# Patient Record
Sex: FEMALE | Race: WHITE | NOT HISPANIC OR LATINO | URBAN - METROPOLITAN AREA
[De-identification: names, ages, dates, MRNs, and addresses within clinical notes are randomized per-mention and may not be internally consistent; named-entity substitution may affect disease eponyms.]

---

## 2023-09-14 ENCOUNTER — OUTPATIENT (OUTPATIENT)
Dept: OUTPATIENT SERVICES | Facility: HOSPITAL | Age: 83
LOS: 1 days | Discharge: HOME | End: 2023-09-14
Payer: MEDICARE

## 2023-09-14 LAB
ISTAT INR: 1 — SIGNIFICANT CHANGE UP (ref 0.88–1.16)
ISTAT PT: 11.5 SEC — SIGNIFICANT CHANGE UP (ref 10–12.9)
ISTAT VENOUS BE: 1 MMOL/L — SIGNIFICANT CHANGE UP (ref -2–3)
ISTAT VENOUS GLUCOSE: 102 MG/DL — HIGH (ref 70–99)
ISTAT VENOUS HCO3: 25 MMOL/L — SIGNIFICANT CHANGE UP (ref 23–28)
ISTAT VENOUS HEMATOCRIT: 36 % — SIGNIFICANT CHANGE UP (ref 34.5–45)
ISTAT VENOUS HEMOGLOBIN: 12.2 GM/DL — SIGNIFICANT CHANGE UP (ref 11.5–15.5)
ISTAT VENOUS IONIZED CALCIUM: 1.22 MMOL/L — SIGNIFICANT CHANGE UP (ref 1.12–1.3)
ISTAT VENOUS PCO2: 39 MMHG — LOW (ref 41–51)
ISTAT VENOUS PH: 7.42 — HIGH (ref 7.31–7.41)
ISTAT VENOUS PO2: <66 MMHG — LOW (ref 35–40)
ISTAT VENOUS POTASSIUM: 4.3 MMOL/L — SIGNIFICANT CHANGE UP (ref 3.5–5.3)
ISTAT VENOUS SO2: 36 % — SIGNIFICANT CHANGE UP
ISTAT VENOUS SODIUM: 132 MMOL/L — LOW (ref 135–145)
ISTAT VENOUS TCO2: 26 MMOL/L — SIGNIFICANT CHANGE UP (ref 22–31)

## 2023-09-14 PROCEDURE — 82947 ASSAY GLUCOSE BLOOD QUANT: CPT

## 2023-09-14 PROCEDURE — C1889: CPT

## 2023-09-14 PROCEDURE — 85014 HEMATOCRIT: CPT

## 2023-09-14 PROCEDURE — 85610 PROTHROMBIN TIME: CPT

## 2023-09-14 PROCEDURE — 82803 BLOOD GASES ANY COMBINATION: CPT

## 2023-09-14 PROCEDURE — 84132 ASSAY OF SERUM POTASSIUM: CPT

## 2023-09-14 PROCEDURE — C1785: CPT

## 2023-09-14 PROCEDURE — 33228 REMV&REPLC PM GEN DUAL LEAD: CPT

## 2023-09-14 PROCEDURE — 84295 ASSAY OF SERUM SODIUM: CPT

## 2023-09-14 PROCEDURE — 82330 ASSAY OF CALCIUM: CPT

## 2023-09-14 NOTE — PROGRESS NOTE ADULT - SUBJECTIVE AND OBJECTIVE BOX
Cardiac Electrophysiology Admission Note    History of Present Illness: Jaz Cuenca is a     AP <1%   >99%      Past Medical History:    Past Surgical History:    Family History: Non-contributory  Social History: denies smoking, drugs.  Social ETOH (0-5 drinks a week)  Allergies: shellfish, eggs, soy, gluten, dairy, latex  Medications: Reglan PRN and Zyrtec PRN  Physical:   HR: 95		BP: 125/78		O2 Sat 100%  	Temp: afebrile  GEN: NAD  HEENT: no JVD  CARDS: S1S2 RRR  LUNGS: CTAB no w/r/r  EXT: no edema  NEURO: no deficit noted    EKG: Normal sinus rhythm.    A/P:     Cardiac Electrophysiology Admission Note    History of Present Illness: Jaz Cuenca is an 83 y.o. F with pmhx CHB s/p PPM ([employer]), HTN, and HLD whose PPM has reached OVI and presents for elective generator change.     The patient is PPM dependent. She feels well today, denies c/p, sob, palpitations, lightheadedness, LE edema and syncope.        Device check:   AP <1%   >99%    Leads stable      Past Medical History:   see above     Past Surgical History:    see above     Family History: Non-contributory    Social History:   Allergies: shellfish, eggs, soy, gluten, dairy, latex  Medications: Reglan PRN and Zyrtec PRN  Physical:   HR: 95		BP: 125/78		O2 Sat 100%  	Temp: afebrile  GEN: NAD  HEENT: no JVD  CARDS: S1S2 RRR  LUNGS: CTAB no w/r/r  EXT: no edema  NEURO: no deficit noted    EKG: Normal sinus rhythm.    A/P:     Cardiac Electrophysiology Admission Note    History of Present Illness: Jaz Cuenca is an 83 y.o. F with pmhx CHB s/p PPM ([employer]), HTN, and HLD whose PPM has reached OVI and presents for elective generator change.     The patient is PPM dependent. She feels well today, denies c/p, sob, palpitations, lightheadedness, LE edema and syncope.        Device check:   AP <1%   >99%    Leads stable      Past Medical History:   see above     Past Surgical History:    see above     Family History: Non-contributory    Social History: Non-smoker    Allergies: shellfish, eggs, soy, gluten, dairy, latex    Medications:   Verapamil 120mg PO QD  Famotidine 20mg PO QD  Montelukast 10mg PO QD  Trelegy  Atorvastatin 10 mg PO QD  Telmisartan 60mg PO QD  Azelastine   Flovent  Prolia    Physical:     HR: 70		BP: 125/78		O2 Sat 100%  	Temp: afebrile  GEN: NAD  HEENT: no JVD  CARDS: S1S2 RRR  LUNGS: CTAB no w/r/r  EXT: no edema  NEURO: no deficit noted    EKG: Normal sinus rhythm, ASVP    A/P:    - Plan for generator change. D/C home following pending clinical status.